# Patient Record
Sex: MALE | Race: WHITE | NOT HISPANIC OR LATINO | Employment: OTHER | URBAN - METROPOLITAN AREA
[De-identification: names, ages, dates, MRNs, and addresses within clinical notes are randomized per-mention and may not be internally consistent; named-entity substitution may affect disease eponyms.]

---

## 2019-04-10 ENCOUNTER — HOSPITAL ENCOUNTER (EMERGENCY)
Facility: HOSPITAL | Age: 79
Discharge: HOME | End: 2019-04-10
Attending: EMERGENCY MEDICINE | Admitting: EMERGENCY MEDICINE

## 2019-04-10 VITALS
BODY MASS INDEX: 23.19 KG/M2 | WEIGHT: 153 LBS | RESPIRATION RATE: 18 BRPM | HEART RATE: 63 BPM | HEIGHT: 68 IN | SYSTOLIC BLOOD PRESSURE: 114 MMHG | OXYGEN SATURATION: 95 % | TEMPERATURE: 97.8 F | DIASTOLIC BLOOD PRESSURE: 48 MMHG

## 2019-04-10 DIAGNOSIS — R73.9 HYPERGLYCEMIA: Primary | ICD-10-CM

## 2019-04-10 PROBLEM — Z72.0 TOBACCO ABUSE: Status: ACTIVE | Noted: 2019-04-10

## 2019-04-10 PROBLEM — I10 ESSENTIAL HYPERTENSION: Status: ACTIVE | Noted: 2019-04-10

## 2019-04-10 PROBLEM — M19.90 ARTHRITIS: Status: ACTIVE | Noted: 2019-04-10

## 2019-04-10 LAB
ALBUMIN SERPL-MCNC: 3.8 G/DL (ref 3.5–5.2)
ALBUMIN/GLOB SERPL: 1.3 G/DL
ALP SERPL-CCNC: 122 U/L (ref 39–117)
ALT SERPL W P-5'-P-CCNC: 18 U/L (ref 1–41)
ANION GAP SERPL CALCULATED.3IONS-SCNC: 16 MMOL/L
AST SERPL-CCNC: 32 U/L (ref 1–40)
ATMOSPHERIC PRESS: ABNORMAL MMHG
B-OH-BUTYR SERPL-SCNC: 2.73 MMOL/L (ref 0.02–0.27)
BASE EXCESS BLDV CALC-SCNC: -0.1 MMOL/L (ref -2–2)
BASOPHILS # BLD AUTO: 0.02 10*3/MM3 (ref 0–0.2)
BASOPHILS NFR BLD AUTO: 0.2 % (ref 0–1.5)
BDY SITE: ABNORMAL
BILIRUB SERPL-MCNC: 1 MG/DL (ref 0.2–1.2)
BILIRUB UR QL STRIP: NEGATIVE
BODY TEMPERATURE: 37 C
BUN BLD-MCNC: 34 MG/DL (ref 8–23)
BUN/CREAT SERPL: 30.1 (ref 7–25)
CALCIUM SPEC-SCNC: 8.9 MG/DL (ref 8.6–10.5)
CHLORIDE SERPL-SCNC: 89 MMOL/L (ref 98–107)
CLARITY UR: CLEAR
CO2 BLDA-SCNC: 27.5 MMOL/L (ref 23–27)
CO2 SERPL-SCNC: 22 MMOL/L (ref 22–29)
COHGB MFR BLD: 2.9 %
COLOR UR: YELLOW
CREAT BLD-MCNC: 1.13 MG/DL (ref 0.76–1.27)
DEPRECATED RDW RBC AUTO: 46.5 FL (ref 37–54)
EOSINOPHIL # BLD AUTO: 0.06 10*3/MM3 (ref 0–0.4)
EOSINOPHIL NFR BLD AUTO: 0.5 % (ref 0.3–6.2)
ERYTHROCYTE [DISTWIDTH] IN BLOOD BY AUTOMATED COUNT: 14.2 % (ref 12.3–15.4)
GFR SERPL CREATININE-BSD FRML MDRD: 63 ML/MIN/1.73
GLOBULIN UR ELPH-MCNC: 2.9 GM/DL
GLUCOSE BLD-MCNC: 493 MG/DL (ref 65–99)
GLUCOSE BLDC GLUCOMTR-MCNC: 184 MG/DL (ref 70–130)
GLUCOSE BLDC GLUCOMTR-MCNC: 217 MG/DL (ref 70–130)
GLUCOSE BLDC GLUCOMTR-MCNC: 240 MG/DL (ref 70–130)
GLUCOSE BLDC GLUCOMTR-MCNC: 341 MG/DL (ref 70–130)
GLUCOSE BLDC GLUCOMTR-MCNC: 515 MG/DL (ref 70–130)
GLUCOSE UR STRIP-MCNC: ABNORMAL MG/DL
HCO3 BLDV-SCNC: 26 MMOL/L (ref 22–28)
HCT VFR BLD AUTO: 39.9 % (ref 37.5–51)
HGB BLD-MCNC: 13.1 G/DL (ref 13–17.7)
HGB BLDA-MCNC: 13.3 G/DL (ref 13.5–17.5)
HGB UR QL STRIP.AUTO: NEGATIVE
HOLD SPECIMEN: NORMAL
HOLD SPECIMEN: NORMAL
HOROWITZ INDEX BLD+IHG-RTO: 21 %
IMM GRANULOCYTES # BLD AUTO: 0.04 10*3/MM3 (ref 0–0.05)
IMM GRANULOCYTES NFR BLD AUTO: 0.3 % (ref 0–0.5)
KETONES UR QL STRIP: ABNORMAL
LEUKOCYTE ESTERASE UR QL STRIP.AUTO: NEGATIVE
LYMPHOCYTES # BLD AUTO: 2.02 10*3/MM3 (ref 0.7–3.1)
LYMPHOCYTES NFR BLD AUTO: 17 % (ref 19.6–45.3)
MCH RBC QN AUTO: 29.2 PG (ref 26.6–33)
MCHC RBC AUTO-ENTMCNC: 32.8 G/DL (ref 31.5–35.7)
MCV RBC AUTO: 89.1 FL (ref 79–97)
METHGB BLD QL: 1 %
MODALITY: ABNORMAL
MONOCYTES # BLD AUTO: 0.85 10*3/MM3 (ref 0.1–0.9)
MONOCYTES NFR BLD AUTO: 7.2 % (ref 5–12)
NEUTROPHILS # BLD AUTO: 8.92 10*3/MM3 (ref 1.4–7)
NEUTROPHILS NFR BLD AUTO: 75.1 % (ref 42.7–76)
NITRITE UR QL STRIP: NEGATIVE
NOTE: ABNORMAL
OXYHGB MFR BLDV: 51.1 %
PCO2 BLDV: 47.1 MM HG (ref 41–51)
PH BLDV: 7.35 PH UNITS
PH UR STRIP.AUTO: <=5 [PH] (ref 5–8)
PLATELET # BLD AUTO: 166 10*3/MM3 (ref 140–450)
PMV BLD AUTO: 11.4 FL (ref 6–12)
PO2 BLDV: 27.9 MM HG (ref 27–53)
POTASSIUM BLD-SCNC: 4.4 MMOL/L (ref 3.5–5.2)
PROT SERPL-MCNC: 6.7 G/DL (ref 6–8.5)
PROT UR QL STRIP: NEGATIVE
RBC # BLD AUTO: 4.48 10*6/MM3 (ref 4.14–5.8)
SODIUM BLD-SCNC: 127 MMOL/L (ref 136–145)
SP GR UR STRIP: 1.03 (ref 1–1.03)
UROBILINOGEN UR QL STRIP: ABNORMAL
VENTILATOR MODE: ABNORMAL
WBC NRBC COR # BLD: 11.87 10*3/MM3 (ref 3.4–10.8)
WHOLE BLOOD HOLD SPECIMEN: NORMAL
WHOLE BLOOD HOLD SPECIMEN: NORMAL

## 2019-04-10 PROCEDURE — 80053 COMPREHEN METABOLIC PANEL: CPT | Performed by: EMERGENCY MEDICINE

## 2019-04-10 PROCEDURE — 85025 COMPLETE CBC W/AUTO DIFF WBC: CPT | Performed by: EMERGENCY MEDICINE

## 2019-04-10 PROCEDURE — 82962 GLUCOSE BLOOD TEST: CPT

## 2019-04-10 PROCEDURE — 82820 HEMOGLOBIN-OXYGEN AFFINITY: CPT

## 2019-04-10 PROCEDURE — 82010 KETONE BODYS QUAN: CPT | Performed by: EMERGENCY MEDICINE

## 2019-04-10 PROCEDURE — 96366 THER/PROPH/DIAG IV INF ADDON: CPT

## 2019-04-10 PROCEDURE — 81003 URINALYSIS AUTO W/O SCOPE: CPT | Performed by: EMERGENCY MEDICINE

## 2019-04-10 PROCEDURE — 82805 BLOOD GASES W/O2 SATURATION: CPT

## 2019-04-10 PROCEDURE — 99284 EMERGENCY DEPT VISIT MOD MDM: CPT

## 2019-04-10 PROCEDURE — 96365 THER/PROPH/DIAG IV INF INIT: CPT

## 2019-04-10 PROCEDURE — 63710000001 INSULIN REGULAR HUMAN PER 5 UNITS: Performed by: EMERGENCY MEDICINE

## 2019-04-10 RX ORDER — SODIUM CHLORIDE 450 MG/100ML
250 INJECTION, SOLUTION INTRAVENOUS CONTINUOUS
Status: DISCONTINUED | OUTPATIENT
Start: 2019-04-10 | End: 2019-04-10 | Stop reason: HOSPADM

## 2019-04-10 RX ORDER — POTASSIUM CHLORIDE 1.5 G/1.77G
40 POWDER, FOR SOLUTION ORAL AS NEEDED
Status: DISCONTINUED | OUTPATIENT
Start: 2019-04-10 | End: 2019-04-10 | Stop reason: HOSPADM

## 2019-04-10 RX ORDER — DEXTROSE MONOHYDRATE 25 G/50ML
12.5 INJECTION, SOLUTION INTRAVENOUS
Status: DISCONTINUED | OUTPATIENT
Start: 2019-04-10 | End: 2019-04-10 | Stop reason: HOSPADM

## 2019-04-10 RX ORDER — DEXTROSE, SODIUM CHLORIDE, AND POTASSIUM CHLORIDE 5; .45; .15 G/100ML; G/100ML; G/100ML
150 INJECTION INTRAVENOUS CONTINUOUS PRN
Status: DISCONTINUED | OUTPATIENT
Start: 2019-04-10 | End: 2019-04-10 | Stop reason: HOSPADM

## 2019-04-10 RX ORDER — POTASSIUM CHLORIDE 7.46 G/1000ML
10 INJECTION, SOLUTION INTRAVENOUS AS NEEDED
Status: DISCONTINUED | OUTPATIENT
Start: 2019-04-10 | End: 2019-04-10 | Stop reason: HOSPADM

## 2019-04-10 RX ORDER — POTASSIUM CHLORIDE 1.5 G/1.77G
20 POWDER, FOR SOLUTION ORAL AS NEEDED
Status: DISCONTINUED | OUTPATIENT
Start: 2019-04-10 | End: 2019-04-10 | Stop reason: HOSPADM

## 2019-04-10 RX ORDER — SODIUM CHLORIDE AND POTASSIUM CHLORIDE 150; 450 MG/100ML; MG/100ML
250 INJECTION, SOLUTION INTRAVENOUS CONTINUOUS PRN
Status: DISCONTINUED | OUTPATIENT
Start: 2019-04-10 | End: 2019-04-10 | Stop reason: HOSPADM

## 2019-04-10 RX ORDER — POTASSIUM CHLORIDE 750 MG/1
20 CAPSULE, EXTENDED RELEASE ORAL AS NEEDED
Status: DISCONTINUED | OUTPATIENT
Start: 2019-04-10 | End: 2019-04-10 | Stop reason: HOSPADM

## 2019-04-10 RX ORDER — POTASSIUM CHLORIDE 1.5 G/1.77G
10 POWDER, FOR SOLUTION ORAL AS NEEDED
Status: DISCONTINUED | OUTPATIENT
Start: 2019-04-10 | End: 2019-04-10 | Stop reason: HOSPADM

## 2019-04-10 RX ORDER — SODIUM CHLORIDE 0.9 % (FLUSH) 0.9 %
10 SYRINGE (ML) INJECTION AS NEEDED
Status: DISCONTINUED | OUTPATIENT
Start: 2019-04-10 | End: 2019-04-10 | Stop reason: HOSPADM

## 2019-04-10 RX ORDER — POTASSIUM CHLORIDE 750 MG/1
40 CAPSULE, EXTENDED RELEASE ORAL AS NEEDED
Status: DISCONTINUED | OUTPATIENT
Start: 2019-04-10 | End: 2019-04-10 | Stop reason: HOSPADM

## 2019-04-10 RX ORDER — POTASSIUM CHLORIDE 750 MG/1
10 CAPSULE, EXTENDED RELEASE ORAL AS NEEDED
Status: DISCONTINUED | OUTPATIENT
Start: 2019-04-10 | End: 2019-04-10 | Stop reason: HOSPADM

## 2019-04-10 RX ORDER — DEXTROSE AND SODIUM CHLORIDE 5; .45 G/100ML; G/100ML
150 INJECTION, SOLUTION INTRAVENOUS CONTINUOUS PRN
Status: DISCONTINUED | OUTPATIENT
Start: 2019-04-10 | End: 2019-04-10 | Stop reason: HOSPADM

## 2019-04-10 RX ADMIN — SODIUM CHLORIDE 1000 ML: 9 INJECTION, SOLUTION INTRAVENOUS at 17:28

## 2019-04-10 RX ADMIN — SODIUM CHLORIDE 0.1 UNITS/KG/HR: 9 INJECTION, SOLUTION INTRAVENOUS at 17:43

## 2019-04-11 NOTE — DISCHARGE INSTRUCTIONS
You will need to check your blood sugar every 2 hours for the next 8 hours.  Follow-up with your doctor tomorrow.  It is important that you follow-up with your doctor and discuss any insulin issues or needs that you may have.  Until then, continue the insulin regimen that your doctor has prescribed.  Return if there is any difficulty with this home plan or any concerns.

## 2019-04-11 NOTE — ED PROVIDER NOTES
Subjective   78-year-old white male insulin-dependent diabetic complaining of elevated blood sugars.  Patient states that he noticed he was having readings in the 500 range today.  He admits that yesterday, he was having readings in the 300 range which is high for him.  He recalls that he was changed from Lantus to NovoLog and attributes this change to the elevated readings.  Patient denies any vomiting, chest pain, abdominal pain, diarrhea, or documented fever.  Patient states that he has been drinking liquids and has no other complaints.        History provided by:  Patient  Diabetes   He presents for his initial diabetic visit. He has type 1 diabetes mellitus. There are no hypoglycemic associated symptoms. Pertinent negatives for hypoglycemia include no confusion. Pertinent negatives for diabetes include no blurred vision, no chest pain, no foot paresthesias, no polydipsia and no polyphagia. There are no hypoglycemic complications. Symptoms are stable. There are no diabetic complications. There are no known risk factors for coronary artery disease. Current diabetic treatment includes insulin injections. He is compliant with treatment most of the time. His weight is stable. He is following a diabetic diet.       Review of Systems   Eyes: Negative for blurred vision.   Cardiovascular: Negative for chest pain.   Endocrine: Negative for polydipsia and polyphagia.   Psychiatric/Behavioral: Negative for confusion.   All other systems reviewed and are negative.      Past Medical History:   Diagnosis Date   • Arthritis    • Diabetes mellitus (CMS/HCC)    • Hypertension        No Known Allergies    Past Surgical History:   Procedure Laterality Date   • CORONARY ANGIOPLASTY WITH STENT PLACEMENT  2015       Family History   Problem Relation Age of Onset   • Cancer Mother    • Cancer Father        Social History     Socioeconomic History   • Marital status:      Spouse name: Not on file   • Number of children: Not on  file   • Years of education: Not on file   • Highest education level: Not on file   Tobacco Use   • Smoking status: Current Every Day Smoker   Substance and Sexual Activity   • Alcohol use: Yes   • Drug use: No           Objective   Physical Exam   Constitutional: He appears well-developed and well-nourished.   HENT:   Head: Normocephalic and atraumatic.   Eyes: Conjunctivae are normal.   Neck: Normal range of motion. Neck supple.   Cardiovascular: Normal rate, regular rhythm and normal heart sounds. Exam reveals no friction rub.   No murmur heard.  Pulmonary/Chest: Effort normal and breath sounds normal. No stridor. No respiratory distress.   Abdominal: Soft. Bowel sounds are normal. He exhibits no distension. There is no tenderness. There is no guarding.   Musculoskeletal: Normal range of motion.   Neurological: He is alert.   Skin: Skin is warm and dry. Capillary refill takes less than 2 seconds.   Psychiatric: He has a normal mood and affect. His behavior is normal.   Nursing note and vitals reviewed.      Procedures           ED Course  ED Course as of Apr 10 2217   Wed Apr 10, 2019   1925 Spoke with patient we will admit him to the hospital.  I spoke with Dr. DU who is agreed to admit the patient.  [JI]   2111 Patient feeling much better and now wants to go home.  I called Dr. DU back.  We discussed the case.  Given that the patient's pH is 7.35, patient feels much better and in fact states that he feels better than he has in a long time with no vomiting and wanting to eat, we will send him home but stressed the importance of follow-up tomorrow a.m.  I also stressed the importance of checking his blood sugar.  Patient has a family member with him as well who I stressed the importance of checking on him tonight.  Patient agreed with me and thanked me.  [JI]      ED Course User Index  [JI] Frederic Thornton PA        Recent Results (from the past 24 hour(s))   POC Glucose Once    Collection Time: 04/10/19  1:36 PM    Result Value Ref Range    Glucose 515 (C) 70 - 130 mg/dL   Light Blue Top    Collection Time: 04/10/19  1:47 PM   Result Value Ref Range    Extra Tube hold for add-on    Green Top (Gel)    Collection Time: 04/10/19  1:47 PM   Result Value Ref Range    Extra Tube Hold for add-ons.    Gold Top - SST    Collection Time: 04/10/19  1:47 PM   Result Value Ref Range    Extra Tube Hold for add-ons.    Comprehensive Metabolic Panel    Collection Time: 04/10/19  1:47 PM   Result Value Ref Range    Glucose 493 (C) 65 - 99 mg/dL    BUN 34 (H) 8 - 23 mg/dL    Creatinine 1.13 0.76 - 1.27 mg/dL    Sodium 127 (L) 136 - 145 mmol/L    Potassium 4.4 3.5 - 5.2 mmol/L    Chloride 89 (L) 98 - 107 mmol/L    CO2 22.0 22.0 - 29.0 mmol/L    Calcium 8.9 8.6 - 10.5 mg/dL    Total Protein 6.7 6.0 - 8.5 g/dL    Albumin 3.80 3.50 - 5.20 g/dL    ALT (SGPT) 18 1 - 41 U/L    AST (SGOT) 32 1 - 40 U/L    Alkaline Phosphatase 122 (H) 39 - 117 U/L    Total Bilirubin 1.0 0.2 - 1.2 mg/dL    eGFR Non African Amer 63 >60 mL/min/1.73    Globulin 2.9 gm/dL    A/G Ratio 1.3 g/dL    BUN/Creatinine Ratio 30.1 (H) 7.0 - 25.0    Anion Gap 16.0 mmol/L   Beta Hydroxybutyrate Quantitative    Collection Time: 04/10/19  1:47 PM   Result Value Ref Range    Beta-Hydroxybutyrate Quant 2.734 (H) 0.020 - 0.270 mmol/L   Lavender Top    Collection Time: 04/10/19  1:48 PM   Result Value Ref Range    Extra Tube hold for add-on    CBC Auto Differential    Collection Time: 04/10/19  1:48 PM   Result Value Ref Range    WBC 11.87 (H) 3.40 - 10.80 10*3/mm3    RBC 4.48 4.14 - 5.80 10*6/mm3    Hemoglobin 13.1 13.0 - 17.7 g/dL    Hematocrit 39.9 37.5 - 51.0 %    MCV 89.1 79.0 - 97.0 fL    MCH 29.2 26.6 - 33.0 pg    MCHC 32.8 31.5 - 35.7 g/dL    RDW 14.2 12.3 - 15.4 %    RDW-SD 46.5 37.0 - 54.0 fl    MPV 11.4 6.0 - 12.0 fL    Platelets 166 140 - 450 10*3/mm3    Neutrophil % 75.1 42.7 - 76.0 %    Lymphocyte % 17.0 (L) 19.6 - 45.3 %    Monocyte % 7.2 5.0 - 12.0 %    Eosinophil % 0.5 0.3  - 6.2 %    Basophil % 0.2 0.0 - 1.5 %    Immature Grans % 0.3 0.0 - 0.5 %    Neutrophils, Absolute 8.92 (H) 1.40 - 7.00 10*3/mm3    Lymphocytes, Absolute 2.02 0.70 - 3.10 10*3/mm3    Monocytes, Absolute 0.85 0.10 - 0.90 10*3/mm3    Eosinophils, Absolute 0.06 0.00 - 0.40 10*3/mm3    Basophils, Absolute 0.02 0.00 - 0.20 10*3/mm3    Immature Grans, Absolute 0.04 0.00 - 0.05 10*3/mm3   Urinalysis With Microscopic If Indicated (No Culture) - Urine, Clean Catch    Collection Time: 04/10/19  4:05 PM   Result Value Ref Range    Color, UA Yellow Yellow, Straw    Appearance, UA Clear Clear    pH, UA <=5.0 5.0 - 8.0    Specific Gravity, UA 1.028 1.001 - 1.030    Glucose, UA >=1000 mg/dL (3+) (A) Negative    Ketones, UA 40 mg/dL (2+) (A) Negative    Bilirubin, UA Negative Negative    Blood, UA Negative Negative    Protein, UA Negative Negative    Leuk Esterase, UA Negative Negative    Nitrite, UA Negative Negative    Urobilinogen, UA 0.2 E.U./dL 0.2 - 1.0 E.U./dL   Blood Gas, Venous With Co-Ox    Collection Time: 04/10/19  5:26 PM   Result Value Ref Range    Site OTHER     pH, Venous 7.350 pH Units    pCO2, Venous 47.1 41.0 - 51.0 mm Hg    pO2, Venous 27.9 27.0 - 53.0 mm Hg    HCO3, Venous 26.0 22.0 - 28.0 mmol/L    Base Excess, Venous -0.1 -2.0 - 2.0 mmol/L    Hemoglobin, Blood Gas 13.3 (L) 13.5 - 17.5 g/dL    Oxyhemoglobin Venous 51.1 %    Methemoglobin Venous 1.0 %    Carboxyhemoglobin Venous 2.9 %    CO2 Content 27.5 (H) 23 - 27 mmol/L    Temperature 37.0 C    Barometric Pressure for Blood Gas  mmHg    Modality Room Air     FIO2 21 %    Ventilator Mode       Note     POC Glucose Once    Collection Time: 04/10/19  5:32 PM   Result Value Ref Range    Glucose 341 (H) 70 - 130 mg/dL   POC Glucose Once    Collection Time: 04/10/19  7:06 PM   Result Value Ref Range    Glucose 217 (H) 70 - 130 mg/dL   POC Glucose Once    Collection Time: 04/10/19  8:11 PM   Result Value Ref Range    Glucose 184 (H) 70 - 130 mg/dL   POC Glucose  Once    Collection Time: 04/10/19  9:26 PM   Result Value Ref Range    Glucose 240 (H) 70 - 130 mg/dL     Note: In addition to lab results from this visit, the labs listed above may include labs taken at another facility or during a different encounter within the last 24 hours. Please correlate lab times with ED admission and discharge times for further clarification of the services performed during this visit.    No orders to display     Vitals:    04/10/19 1900 04/10/19 2000 04/10/19 2030 04/10/19 2100   BP: 134/46 127/63 123/52 114/48   BP Location:       Patient Position:       Pulse: 64 64 64 63   Resp: 18 18 18 18   Temp:       TempSrc:       SpO2: 96% 95% 91% 95%   Weight:       Height:         Medications   sodium chloride 0.9 % flush 10 mL (not administered)   sodium chloride 0.45 % infusion (0 mL/hr Intravenous Hold 4/10/19 1929)   sodium chloride 0.45 % with KCl 20 mEq/L infusion (not administered)   dextrose 5 % and sodium chloride 0.45 % infusion (not administered)   dextrose 5 % and sodium chloride 0.45 % with KCl 20 mEq/L infusion (not administered)   potassium chloride (MICRO-K) CR capsule 40 mEq (not administered)     Or   potassium chloride (KLOR-CON) packet 40 mEq (not administered)     Or   potassium chloride 10 mEq in 100 mL IVPB (not administered)   potassium chloride (MICRO-K) CR capsule 20 mEq (not administered)     Or   potassium chloride (KLOR-CON) packet 20 mEq (not administered)     Or   potassium chloride 10 mEq in 100 mL IVPB (not administered)   potassium chloride (MICRO-K) CR capsule 10 mEq (not administered)     Or   potassium chloride (KLOR-CON) packet 10 mEq (not administered)     Or   potassium chloride 10 mEq in 100 mL IVPB (not administered)   insulin regular (humuLIN R,novoLIN R) 100 Units in sodium chloride 0.9 % 100 mL (1 Units/mL) infusion (0 Units/kg/hr × 69.4 kg Intravenous Stopped 4/10/19 1929)   insulin bolus from bag 7 Units (not administered)   insulin bolus from bag 5  Units (not administered)   dextrose (D50W) 25 g/ 50mL Intravenous Solution 12.5 g (not administered)   sodium chloride 0.9 % bolus 1,000 mL (0 mL Intravenous Stopped 4/10/19 1745)   insulin bolus from bag 7 Units (7 Units Intravenous Bolus from Bag 4/10/19 1742)     ECG/EMG Results (last 24 hours)     ** No results found for the last 24 hours. **        No orders to display                 MDM      Final diagnoses:   Hyperglycemia            Frederic Thornton PA  04/10/19 8095

## 2019-05-24 ENCOUNTER — OFFICE VISIT (OUTPATIENT)
Dept: SURGERY | Age: 79
End: 2019-05-24
Payer: MEDICARE

## 2019-05-24 VITALS
DIASTOLIC BLOOD PRESSURE: 49 MMHG | WEIGHT: 143 LBS | HEIGHT: 68 IN | BODY MASS INDEX: 21.67 KG/M2 | SYSTOLIC BLOOD PRESSURE: 101 MMHG | HEART RATE: 64 BPM

## 2019-05-24 DIAGNOSIS — M79.604 PAIN IN BOTH LOWER EXTREMITIES: ICD-10-CM

## 2019-05-24 DIAGNOSIS — M79.605 PAIN IN BOTH LOWER EXTREMITIES: ICD-10-CM

## 2019-05-24 DIAGNOSIS — I73.9 PAD (PERIPHERAL ARTERY DISEASE) (HCC): ICD-10-CM

## 2019-05-24 DIAGNOSIS — I70.229 CRITICAL LOWER LIMB ISCHEMIA (HCC): ICD-10-CM

## 2019-05-24 DIAGNOSIS — E11.51 TYPE 2 DIABETES MELLITUS WITH ATHEROSCLEROSIS OF NATIVE ARTERIES OF EXTREMITY WITH REST PAIN (HCC): ICD-10-CM

## 2019-05-24 DIAGNOSIS — I70.229 TYPE 2 DIABETES MELLITUS WITH ATHEROSCLEROSIS OF NATIVE ARTERIES OF EXTREMITY WITH REST PAIN (HCC): ICD-10-CM

## 2019-05-24 DIAGNOSIS — I73.9 PAD (PERIPHERAL ARTERY DISEASE) (HCC): Primary | ICD-10-CM

## 2019-05-24 DIAGNOSIS — I70.228 ATHEROSCLEROSIS OF NATIVE ARTERIES OF EXTREMITIES WITH REST PAIN, OTHER EXTREMITY (HCC): ICD-10-CM

## 2019-05-24 LAB
ANION GAP SERPL CALCULATED.3IONS-SCNC: 13 MMOL/L (ref 3–16)
BUN BLDV-MCNC: 24 MG/DL (ref 7–20)
CALCIUM SERPL-MCNC: 8.4 MG/DL (ref 8.3–10.6)
CHLORIDE BLD-SCNC: 96 MMOL/L (ref 99–110)
CO2: 24 MMOL/L (ref 21–32)
CREAT SERPL-MCNC: 0.7 MG/DL (ref 0.8–1.3)
GFR AFRICAN AMERICAN: >60
GFR NON-AFRICAN AMERICAN: >60
GLUCOSE BLD-MCNC: 124 MG/DL (ref 70–99)
HCT VFR BLD CALC: 28.4 % (ref 40.5–52.5)
HEMOGLOBIN: 9.4 G/DL (ref 13.5–17.5)
MCH RBC QN AUTO: 29.8 PG (ref 26–34)
MCHC RBC AUTO-ENTMCNC: 33 G/DL (ref 31–36)
MCV RBC AUTO: 90.3 FL (ref 80–100)
PDW BLD-RTO: 14.9 % (ref 12.4–15.4)
PLATELET # BLD: 323 K/UL (ref 135–450)
PMV BLD AUTO: 8.5 FL (ref 5–10.5)
POTASSIUM SERPL-SCNC: 4.1 MMOL/L (ref 3.5–5.1)
RBC # BLD: 3.15 M/UL (ref 4.2–5.9)
SODIUM BLD-SCNC: 133 MMOL/L (ref 136–145)
WBC # BLD: 18.9 K/UL (ref 4–11)

## 2019-05-24 PROCEDURE — 99204 OFFICE O/P NEW MOD 45 MIN: CPT | Performed by: SURGERY

## 2019-05-24 RX ORDER — HYDROCODONE BITARTRATE AND ACETAMINOPHEN 5; 325 MG/1; MG/1
1 TABLET ORAL EVERY 6 HOURS PRN
COMMUNITY

## 2019-05-24 RX ORDER — LISINOPRIL 10 MG/1
10 TABLET ORAL DAILY
COMMUNITY

## 2019-05-24 RX ORDER — LACTULOSE 10 G/10G
10 SOLUTION ORAL 3 TIMES DAILY
COMMUNITY

## 2019-05-24 RX ORDER — ATENOLOL 50 MG/1
50 TABLET ORAL 2 TIMES DAILY
COMMUNITY

## 2019-05-24 RX ORDER — GABAPENTIN 100 MG/1
100 CAPSULE ORAL 3 TIMES DAILY
COMMUNITY

## 2019-05-24 RX ORDER — ATORVASTATIN CALCIUM 40 MG/1
40 TABLET, FILM COATED ORAL DAILY
COMMUNITY

## 2019-05-24 ASSESSMENT — ENCOUNTER SYMPTOMS
GASTROINTESTINAL NEGATIVE: 1
RESPIRATORY NEGATIVE: 1
COLOR CHANGE: 1
EYES NEGATIVE: 1
ALLERGIC/IMMUNOLOGIC NEGATIVE: 1

## 2019-05-24 NOTE — PATIENT INSTRUCTIONS
Patient is to continue his daily 81MG Aspirin for his daily antiplatelet therapy. Patient is to continue his daily 20MG Xarelto for his daily antiplatelet therapy. Patient is to continue his daily 40MG Lipitor for his daily statin therapy. In order to promote wound healing and provide long-term limb salvage, recommend proceeding with diagnostic angiography with possible endovascular revascularization. The Patient will be scheduled for an Out-patient angiogram of the aorta and bilateral lower extremity w/possible revascularization at [facility].    Patient is scheduled for the angiogram on 05/28/2019. Patient was given the letter, procedure date and arrival time in office today.

## 2019-05-24 NOTE — PROGRESS NOTES
Tuesday, due to severity of changes to the foot it is anticipated if blood flow is restored function may not be. Patient is to continue his daily 81MG Aspirin for his daily antiplatelet therapy. Patient is to continue his daily 20MG Xarelto for his daily antiplatelet therapy. Patient is to continue his daily 40MG Lipitor for his daily statin therapy. In order to promote wound healing and provide long-term limb salvage, recommend proceeding with diagnostic angiography with possible endovascular revascularization. The Patient will be scheduled for an Out-patient angiogram of the aorta and bilateral lower extremity w/possible revascularization at [facility].    Patient is scheduled for the angiogram on 05/28/2019. Patient was given the letter, procedure date and arrival time in office today. I Candy Masters MA am scribing for and in the presence of Helder Duggan MD on this date of 05/24/19 at 2:30 PM    I Helder Duggan MD personally performed the services described in this documentation as scribed by the Certified Medical Assistant Candy Masters in my presence and it is both accurate and complete.      Luciana Ordoñez DPM   Podiatric Resident, PGY-2  Pager: [phone]  5/24/2019, 2:30 PM